# Patient Record
Sex: FEMALE | Race: ASIAN | URBAN - METROPOLITAN AREA
[De-identification: names, ages, dates, MRNs, and addresses within clinical notes are randomized per-mention and may not be internally consistent; named-entity substitution may affect disease eponyms.]

---

## 2017-08-10 ENCOUNTER — OFFICE VISIT (OUTPATIENT)
Dept: FAMILY MEDICINE | Facility: CLINIC | Age: 27
End: 2017-08-10

## 2017-08-10 VITALS
DIASTOLIC BLOOD PRESSURE: 72 MMHG | TEMPERATURE: 97.9 F | BODY MASS INDEX: 16.05 KG/M2 | HEART RATE: 83 BPM | SYSTOLIC BLOOD PRESSURE: 109 MMHG | WEIGHT: 90.6 LBS | RESPIRATION RATE: 16 BRPM | HEIGHT: 63 IN | OXYGEN SATURATION: 98 %

## 2017-08-10 DIAGNOSIS — Z00.00 ROUTINE GENERAL MEDICAL EXAMINATION AT A HEALTH CARE FACILITY: ICD-10-CM

## 2017-08-10 DIAGNOSIS — Z23 IMMUNIZATION DUE: ICD-10-CM

## 2017-08-10 DIAGNOSIS — Z12.4 SCREENING FOR CERVICAL CANCER: ICD-10-CM

## 2017-08-10 NOTE — PROGRESS NOTES
Preceptor Attestation:  Patient's case reviewed and discussed with Dianna Duncan MD resident and I evaluated the patient. I agree with written assessment and plan of care.  Supervising Physician:Nacho Brooke MD    [facility]

## 2017-08-10 NOTE — PATIENT INSTRUCTIONS
Preventive Health Recommendations  Female Ages 26 - 39  Yearly exam:   See your health care provider every year in order to    Review health changes.     Discuss preventive care.      Review your medicines if you your doctor has prescribed any.    Until age 30: Get a Pap test every three years (more often if you have had an abnormal result).    After age 30: Talk to your doctor about whether you should have a Pap test every 3 years or have a Pap test with HPV screening every 5 years.   You do not need a Pap test if your uterus was removed (hysterectomy) and you have not had cancer.  You should be tested each year for STDs (sexually transmitted diseases), if you're at risk.   Talk to your provider about how often to have your cholesterol checked.  If you are at risk for diabetes, you should have a diabetes test (fasting glucose).  Shots: Get a flu shot each year. Get a tetanus shot every 10 years.   Nutrition:     Eat at least 5 servings of fruits and vegetables each day.    Eat whole-grain bread, whole-wheat pasta and brown rice instead of white grains and rice.    Talk to your provider about Calcium and Vitamin D.     Lifestyle    Exercise at least 150 minutes a week (30 minutes a day, 5 days of the week). This will help you control your weight and prevent disease.    Limit alcohol to one drink per day.    No smoking.     Wear sunscreen to prevent skin cancer.    See your dentist every six months for an exam and cleaning.    Why Have a Pap Test?  Early on, cervical changes don't cause symptoms. Often, the only way to know you have cervical changes is to do a Pap test. A Pap test can find these problems early, when they are easier to treat. Pap tests can also detect some infections of the cervix and vagina.  What is a Pap test?  A Pap test is a procedure that helps find changes in the cervix that may lead to cancer. The cervix is the part of the uterus that opens into the vagina. For this test, a small sample of  cells is taken from the cervix. This is done in your health care provider s office. The cells are then analyzed in a lab. A Pap test is a safe procedure. It takes just a few minutes and causes little or no discomfort.  The HPV connection  Human papillomavirus or HPV is a family of viruses that spread through skin contact. Certain types are almost always spread through sexual contact. Some HPV types cause genital warts (condyloma). But not all types of HPV cause visible symptoms. Certain types cause cell changes (dysplasia) in the cervix that can lead to cancer. In fact, HPV infection is the most important risk factor for cervical cancer. Doctors can now test for HPV. Testing for HPV is often done with the Pap test. That s why it s important to have Pap tests as recommended by your health care provider. This helps ensure that any abnormal cells will be found and treated before they become cancerous.  Who should have a Pap test and HPV test?  Ask your health care provider when to start having Pap tests, whether you should have an HPV test done at the same time, and how often to have them. Follow these guidelines from the American Cancer Society for cervical cancer screening:    A first Pap test at age 21. And then every 3 years until age 29, HPV testing is not recommended during this time, though it may be done to follow-up on an abnormal Pap test.    Starting at age 30, the preferred testing is a Pap test done with an HPV test every 5 years. This should be done until age 65. Another option for women in this 30-65 age group is to have just the Pap test done every 3 years.    You may need a different screening schedule if you are at high risk for cervical cancer. Risk factors include having HIV, a weak immune system, or exposure to the medication MEMO while your mother was pregnant with you. Talk with your doctor about the best schedule for you.    If you re over 65 and have had regular screenings for the last 10 years  with no abnormal results in the last 20 years, you may stop cervical cancer screening.    If you had a hysterectomy that included removing your cervix, you can stop screening unless the hysterectomy was done to treat cervical cancer or pre-cancer. If you still have your cervix after the hysterectomy, you should continue screening according to the above guidelines.    No woman of any age needs to be tested each year.    Women who have been vaccinated for HPV should still follow these guidelines.    If you have had cervical cancer, talk to your doctor about the screening plan that's best for you.  Date Last Reviewed: 5/18/2015 2000-2017 [employer] [address] [place] [address], [place] [address]. All rights reserved. This information is not intended as a substitute for professional medical care. Always follow your healthcare professional's instructions.

## 2017-08-10 NOTE — MR AVS SNAPSHOT
After Visit Summary   8/10/2017    Sophy Hernandez    MRN: [id number]           Patient Information     Date Of Birth          1990        Visit Information        Provider Department      8/10/2017 10:00 AM Dianna Duncan MD [facility]        Today's Diagnoses     Immunization due        Routine general medical examination at a health care facility        Screening for cervical cancer          Care Instructions      Preventive Health Recommendations  Female Ages 26 - 39  Yearly exam:   See your health care provider every year in order to    Review health changes.     Discuss preventive care.      Review your medicines if you your doctor has prescribed any.    Until age 30: Get a Pap test every three years (more often if you have had an abnormal result).    After age 30: Talk to your doctor about whether you should have a Pap test every 3 years or have a Pap test with HPV screening every 5 years.   You do not need a Pap test if your uterus was removed (hysterectomy) and you have not had cancer.  You should be tested each year for STDs (sexually transmitted diseases), if you're at risk.   Talk to your provider about how often to have your cholesterol checked.  If you are at risk for diabetes, you should have a diabetes test (fasting glucose).  Shots: Get a flu shot each year. Get a tetanus shot every 10 years.   Nutrition:     Eat at least 5 servings of fruits and vegetables each day.    Eat whole-grain bread, whole-wheat pasta and brown rice instead of white grains and rice.    Talk to your provider about Calcium and Vitamin D.     Lifestyle    Exercise at least 150 minutes a week (30 minutes a day, 5 days of the week). This will help you control your weight and prevent disease.    Limit alcohol to one drink per day.    No smoking.     Wear sunscreen to prevent skin cancer.    See your dentist every six months for an exam and cleaning.    Why Have a Pap Test?  Early on, cervical  changes don't cause symptoms. Often, the only way to know you have cervical changes is to do a Pap test. A Pap test can find these problems early, when they are easier to treat. Pap tests can also detect some infections of the cervix and vagina.  What is a Pap test?  A Pap test is a procedure that helps find changes in the cervix that may lead to cancer. The cervix is the part of the uterus that opens into the vagina. For this test, a small sample of cells is taken from the cervix. This is done in your health care provider s office. The cells are then analyzed in a lab. A Pap test is a safe procedure. It takes just a few minutes and causes little or no discomfort.  The HPV connection  Human papillomavirus or HPV is a family of viruses that spread through skin contact. Certain types are almost always spread through sexual contact. Some HPV types cause genital warts (condyloma). But not all types of HPV cause visible symptoms. Certain types cause cell changes (dysplasia) in the cervix that can lead to cancer. In fact, HPV infection is the most important risk factor for cervical cancer. Doctors can now test for HPV. Testing for HPV is often done with the Pap test. That s why it s important to have Pap tests as recommended by your health care provider. This helps ensure that any abnormal cells will be found and treated before they become cancerous.  Who should have a Pap test and HPV test?  Ask your health care provider when to start having Pap tests, whether you should have an HPV test done at the same time, and how often to have them. Follow these guidelines from the American Cancer Society for cervical cancer screening:    A first Pap test at age 21. And then every 3 years until age 29, HPV testing is not recommended during this time, though it may be done to follow-up on an abnormal Pap test.    Starting at age 30, the preferred testing is a Pap test done with an HPV test every 5 years. This should be done until age  65. Another option for women in this 30-65 age group is to have just the Pap test done every 3 years.    You may need a different screening schedule if you are at high risk for cervical cancer. Risk factors include having HIV, a weak immune system, or exposure to the medication MEMO while your mother was pregnant with you. Talk with your doctor about the best schedule for you.    If you re over 65 and have had regular screenings for the last 10 years with no abnormal results in the last 20 years, you may stop cervical cancer screening.    If you had a hysterectomy that included removing your cervix, you can stop screening unless the hysterectomy was done to treat cervical cancer or pre-cancer. If you still have your cervix after the hysterectomy, you should continue screening according to the above guidelines.    No woman of any age needs to be tested each year.    Women who have been vaccinated for HPV should still follow these guidelines.    If you have had cervical cancer, talk to your doctor about the screening plan that's best for you.  Date Last Reviewed: 5/18/2015 2000-2017 [employer]. [address] [place] [address]. All rights reserved. This information is not intended as a substitute for professional medical care. Always follow your healthcare professional's instructions.                Follow-ups after your visit        Who to contact     Please call your clinic at [phone] to:    Ask questions about your health    Make or cancel appointments    Discuss your medicines    Learn about your test results    Speak to your doctor   If you have compliments or concerns about an experience at your clinic, or if you wish to file a complaint, please contact [facility] Patient Relations at [phone] or email us at [email or web address]         Additional Information About Your Visit        [employer] Information     [employer] is an electronic gateway  "that provides easy, online access to your medical records. With [employer], you can request a clinic appointment, read your test results, renew a prescription or communicate with your care team.     To sign up for [employer] visit the website at [email or web address]   You will be asked to enter the access code listed below, as well as some personal information. Please follow the directions to create your username and password.     Your access code is: [email or web address]  Expires: 2017 10:20 AM     Your access code will  in 90 days. If you need help or a new code, please contact your [facility] or call [phone] for assistance.        Care EveryWhere ID     This is your Care EveryWhere ID. This could be used by other organizations to access your [facility] medical records  [id number]        Your Vitals Were     Pulse Temperature Respirations Height Last Period Pulse Oximetry    83 97.9  F (36.6  C) (Oral) 16 5' 2.75\" (159.4 cm) 2017 (Approximate) 98%    BMI (Body Mass Index)                   16.18 kg/m2            Blood Pressure from Last 3 Encounters:   08/10/17 109/72    Weight from Last 3 Encounters:   08/10/17 90 lb 9.6 oz (41.1 kg)              We Performed the Following     ADMIN VACCINE, INITIAL     TDAP VACCINE (BOOSTRIX)        Primary Care Provider Office Phone # Fax #    Dianna Duncan [phone]       [facility] [address] [place] [address]        Equal Access to Services     FELISA BENAVIDES AH: Hadii aad ku hadasho Soomaali, waaxda luqadaha, qaybta kaalmada adeegyada, izzy yadav. So [facility] [phone].    ATENCIÓN: Si habla español, tiene a martin disposición servicios gratuitos de asistencia lingüística. Llame al [phone].    We comply with applicable federal civil rights laws and [place] laws. We do not discriminate on the basis of race, color, national origin, age, disability sex, " sexual orientation or gender identity.            Thank you!     Thank you for choosing [facility]  for your care. Our goal is always to provide you with excellent care. Hearing back from our patients is one way we can continue to improve our services. Please take a few minutes to complete the written survey that you may receive in the mail after your visit with us. Thank you!             Your Updated Medication List - Protect others around you: Learn how to safely use, store and throw away your medicines at www.disposemymeds.org.      Notice  As of 8/10/2017 11:16 AM    You have not been prescribed any medications.

## 2017-08-10 NOTE — PROGRESS NOTES
Female Physical Note  Concerns today: No special concerns today.    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     Traveling to [facility] to visit a friend in April. Wondering how to get a travel appt.     ROS:  CONSTITUTIONAL: no fatigue, no unexpected change in weight  SKIN: no worrisome rashes, no worrisome moles, no worrisome lesions  EYES: no acute vision problems or changes  ENT: no ear problems, no mouth problems, no throat problems  RESP: no significant cough, no shortness of breath  CV: no chest pain, no palpitations, no new or worsening peripheral edema  GI: no nausea, no vomiting, no constipation, no diarrhea  : regular menses. No dysuria.     Sexually Active: Yes  Sexual concerns: No   Contraception:Details: condoms.      P: 0  Menarche: 12 Patient's last menstrual period was 2017 (approximate). Menses: q 28 days  STD History: Neg  Last Pap Smear Date: None.   Abnormal Pap History: None    There is no problem list on file for this patient.      No current outpatient prescriptions on file.       History reviewed. No pertinent past medical history.     Family History     Problem (# of Occurrences) Relation (Name,Age of Onset)    Anxiety Disorder (1) Other    Asthma (1) Other    Breast Cancer (1) Other    Colon Cancer (1) Other    Coronary Artery Disease (1) Other    DIABETES (1) Other    Depression (1) Other    Hypertension (1) Other    Other Cancer (1) Other    Prostate Cancer (1) Other    Thyroid Disease (1) Other          Problem List Medication List and Allergy List were reviewed.    Patient is a new patient to this clinic and so  I reviewed/updated the Past Medical History, the Family History and the Social History. .    Social History   Substance Use Topics     Smoking status: Never Smoker     Smokeless tobacco: Never Used     Alcohol use Yes      Comment: Occasionally     Single  Children ? no    Has anyone hurt you physically, for example by pushing, hitting, slapping or kicking you or  "forcing you to have sex? Denies  Do you feel threatened or controlled by a partner, ex-partner or anyone in your life? Denies    RISK BEHAVIORS AND HEALTHY HABITS:  Tobacco Use/Smoking: None  Illicit Drug Use: None  Do you use alcohol? Yes, special occasions.   Diet (5-7 servings of fruits/veg daily): No, busy, forgets about it. Eats vegetables.  Exercise (30 min accumulated most days):Yes, running and yoga.   Dental Care: Yes   Calcium 1500 mg/d:  No  Seat Belt Use: Yes     Pap every 3 years for women 21-29. Recommended and patient declined testing. Would like to schedule in future, nervous.    Immunization History   Administered Date(s) Administered     DTAP (<7y) 1990, [birth year], 12/18/1991, 11/16/1994     HIB 03/18/1992     HepB-Peds 1990, [birth year]     Influenza Intranasal Vaccine 4 valent 12/05/2003, 10/13/2009     MMR 1990, 01/10/1991, 06/10/1991, 11/16/1994     Poliovirus, inactivated (IPV) 1990, 1990, 12/18/1991, 11/16/1994     TDAP Vaccine (Boostrix) 08/29/2002    Reviewed Immunization Record Today    EXAMINATION:   /72 (BP Location: Right arm, Patient Position: Sitting, Cuff Size: Child)  Pulse 83  Temp 97.9  F (36.6  C) (Oral)  Resp 16  Ht 5' 2.75\" (159.4 cm)  Wt 90 lb 9.6 oz (41.1 kg)  LMP 07/03/2017 (Approximate)  SpO2 (!) 83%  BMI 16.18 kg/m2  GENERAL: healthy, alert and no distress  EYES: Eyes grossly normal to inspection, extraocular movements - intact, and PERRL  HENT: ear canals- normal; TMs- normal; Nose- normal; Mouth- no ulcers, no lesions  NECK: no tenderness, no adenopathy, no asymmetry, no masses, no stiffness; thyroid- normal to palpation  RESP: lungs clear to auscultation - no rales, no rhonchi, no wheezes  CV: regular rates and rhythm, normal S1 S2, no S3 or S4 and no murmur, no click or rub -  ABDOMEN: soft, no tenderness, no  hepatosplenomegaly, no masses, normal bowel sounds  MS: extremities- no gross deformities " noted, no edema  SKIN: no suspicious lesions, no rashes  NEURO: strength and tone- normal, sensory exam- grossly normal, mentation- intact, speech- normal, reflexes- symmetric  BACK: no CVA tenderness, no paralumbar tenderness  - declined exam.   PSYCH: Alert and oriented times 3; speech- coherent , normal rate and volume; able to articulate logical thoughts, able to abstract reason, no tangential thoughts, no hallucinations or delusions, affect- normal  LYMPHATICS: ant. cervical- normal, post. cervical- normal, axillary- normal, supraclavicular- normal, inguinal- normal    ASSESSMENT:  1. Health Care Maintenance: Normal Physical Exam  2. Due for pap- declines today because she is nervous. Explained the pap test and procedure to her.   3. Travel recs.     PLAN:  1. Routine follow up in one year. Due for Tdap today which she accepts.  2. She will schedule again when she can prepare.   3. Reviewed with her the [facility] travel website, she is familiar She will schedule a visit about two months prior to travel. Appears she may need Japanese encephalitis, malaria, typhoid ppx.       Dianna Duncan MD    Precepted today with: Nacho Brooke MD

## 2017-08-22 ENCOUNTER — OFFICE VISIT (OUTPATIENT)
Dept: FAMILY MEDICINE | Facility: CLINIC | Age: 27
End: 2017-08-22

## 2017-08-22 VITALS
DIASTOLIC BLOOD PRESSURE: 72 MMHG | TEMPERATURE: 98.1 F | RESPIRATION RATE: 20 BRPM | WEIGHT: 93 LBS | SYSTOLIC BLOOD PRESSURE: 110 MMHG | HEART RATE: 61 BPM | OXYGEN SATURATION: 100 % | HEIGHT: 62 IN | BODY MASS INDEX: 17.11 KG/M2

## 2017-08-22 DIAGNOSIS — Z12.4 ENCOUNTER FOR SCREENING FOR CERVICAL CANCER: Primary | ICD-10-CM

## 2017-08-22 NOTE — MR AVS SNAPSHOT
"              After Visit Summary   2017    Sophy Hernandez    MRN: [id number]           Patient Information     Date Of Birth          1990        Visit Information        Provider Department      2017 9:00 AM Dianna Duncan MD [facility]        Today's Diagnoses     Encounter for screening for cervical cancer     -  1       Follow-ups after your visit        Who to contact     Please call your clinic at [phone] to:    Ask questions about your health    Make or cancel appointments    Discuss your medicines    Learn about your test results    Speak to your doctor   If you have compliments or concerns about an experience at your clinic, or if you wish to file a complaint, please contact [facility] Patient Relations at [phone] or email us at [email or web address]         Additional Information About Your Visit        MyChart Information     [employer] is an electronic gateway that provides easy, online access to your medical records. With [employer], you can request a clinic appointment, read your test results, renew a prescription or communicate with your care team.     To sign up for [employer] visit the website at [email or web address]   You will be asked to enter the access code listed below, as well as some personal information. Please follow the directions to create your username and password.     Your access code is: [email or web address]  Expires: 2017 10:20 AM     Your access code will  in 90 days. If you need help or a new code, please contact your [facility] or call [phone] for assistance.        Care EveryWhere ID     This is your Care EveryWhere ID. This could be used by other organizations to access your [facility] medical records  [id number]        Your Vitals Were     Pulse Temperature Respirations Height Last Period Pulse Oximetry    61 98.1  F (36.7  C) (Oral) 20 5' 1.5\" (156.2 cm) 2017 " (Approximate) 100%    BMI (Body Mass Index)                   17.29 kg/m2            Blood Pressure from Last 3 Encounters:   08/22/17 110/72   08/10/17 109/72    Weight from Last 3 Encounters:   08/22/17 93 lb (42.2 kg)   08/10/17 90 lb 9.6 oz (41.1 kg)              We Performed the Following     GYN Cytology ([facility])        Primary Care Provider Office Phone # Fax #    Dianna Duncan [phone]       [facility] [address] [place] [address]        Equal Access to Services     [facility] Hospital[facility]ABIHA : Hadii aad ku hadasho Soomaali, waaxda luqadaha, qaybta kaalmada adeegyada, izzy roche . So [facility] [phone].    ATENCIÓN: Si habla español, tiene a martin disposición servicios gratuitos de asistencia lingüística. Llame al [phone].    We comply with applicable federal civil rights laws and [place] laws. We do not discriminate on the basis of race, color, national origin, age, disability sex, sexual orientation or gender identity.            Thank you!     Thank you for choosing [facility]  for your care. Our goal is always to provide you with excellent care. Hearing back from our patients is one way we can continue to improve our services. Please take a few minutes to complete the written survey that you may receive in the mail after your visit with us. Thank you!             Your Updated Medication List - Protect others around you: Learn how to safely use, store and throw away your medicines at www.disposemymeds.org.      Notice  As of 8/22/2017 12:14 PM    You have not been prescribed any medications.

## 2017-08-22 NOTE — PROGRESS NOTES
"       HPI:       Sophy Hernandez is a 27 year old  female without a significant past medical history who presents for follow up concern of pap smear.   She was too nervous to get it done during CPE last week.   She has been worried about this as she has never had pap smear before.   We again talked through the procedure and also why its important to get it done.   No other concerns.          PMHX:     Patient Active Problem List   Diagnosis   (none) - all problems resolved or deleted       No current outpatient prescriptions on file.        No Known Allergies           Review of Systems:   As above          Physical Exam:     Vitals:    08/22/17 0908   BP: 110/72   BP Location: Left arm   Patient Position: Chair   Cuff Size: Adult Regular   Pulse: 61   Resp: 20   Temp: 98.1  F (36.7  C)   TempSrc: Oral   SpO2: 100%   Weight: 93 lb (42.2 kg)   Height: 5' 1.5\" (156.2 cm)     Body mass index is 17.29 kg/(m^2).    Vitals reviewed and normal.  GENERAL APPEARANCE: healthy, alert and no distress  EYES: Eyes grossly normal to inspection, conjunctivae and sclerae normal  HENT:  oropharynx clear and oral mucous membranes moist  RESP: breathing comfortably in room air.   CV: no peripheral edema, peripheral pulses 2+   (female): normal female external genitalia, vaginal mucosa pink, moist, well rugated and normal cervix, adnexae, and uterus without masses. Normal vaginal discharge  SKIN: no suspicious lesions or rashes  PSYCH: mentation appears normal and affect normal    Assessment and Plan     (Z12.4) Encounter for screening for cervical cancer   (primary encounter diagnosis)- normal appearing cervix. Reviewed recs for screening.   Plan: GYN cytology sent.   Will send letter if normal, Will call if abnormal.   Discussed results take about one week.     Options for treatment and follow-up care were reviewed with the patient and/or guardian. Sophy Her and/or guardian engaged in the decision making process and verbalized " understanding of the options discussed and agreed with the final plan.    Dianna Duncan MD    Precepted today with: Nacho Brooke MD

## 2017-08-30 ENCOUNTER — RECORDS - HEALTHEAST (OUTPATIENT)
Dept: ADMINISTRATIVE | Facility: OTHER | Age: 27
End: 2017-08-30

## 2017-08-30 LAB
CYTOLOGY CVX/VAG DOC THIN PREP: ABNORMAL
HIGH RISK?: NO
HPV REFLEX?: ABNORMAL
LAB AP ABNORMAL BLEEDING: NO
LAB AP BIRTH CONTROL/HORMONES: ABNORMAL
LAB AP CASE REPORT: ABNORMAL
LAB AP CERVICAL APPEARANCE: NORMAL
LAB AP MALIGNANT?: ABNORMAL
LAB AP PATIENT STATUS: ABNORMAL
LAB AP PREVIOUS ABNL: ABNORMAL
LAB AP PREVIOUS NORMAL: ABNORMAL
LAST MENS PERIOD: ABNORMAL
SPECIMEN ADEQUACY:: ABNORMAL

## 2017-09-01 ENCOUNTER — TELEPHONE (OUTPATIENT)
Dept: FAMILY MEDICINE | Facility: CLINIC | Age: 27
End: 2017-09-01

## 2017-09-01 PROBLEM — R87.610 PAPANICOLAOU SMEAR OF CERVIX WITH ATYPICAL SQUAMOUS CELLS OF UNDETERMINED SIGNIFICANCE (ASC-US): Status: ACTIVE | Noted: 2017-08-28

## 2017-09-01 LAB
INTERPRETATION: ABNORMAL
INTERPRETER: ABNORMAL

## 2017-09-01 NOTE — TELEPHONE ENCOUNTER
Reviewed. Action completed:  Notes Recorded by Karlee Kunz MA on 9/1/2017 at 1:43 PM  Called patient and informed results and recommendations. Explained procedure to patient as she seems worried and concerned. Patient reports being on her menstrual cycle so informed to try and make appointment when it has completed. Transferred call to  for her to make with Dr Luo.  ------    Notes Recorded by Dianna Duncan MD on 9/1/2017 at 12:11 PM  Patient was called, no answer, no descript VM so LVM to call clinic back.   Sophy will be very nervous.  She needs colposcopy- pap smear showed possibly abnormal cells and her test for HPV (human papilloma virus) was positive.   Please have her schedule her earliest convenience with Dr. Luo as soon as she can.   Thank you.   Dianna

## 2017-09-01 NOTE — TELEPHONE ENCOUNTER
[facility] phone call message- patient requesting results:    Test: Lab    Date of test:     Additional Comments: Patient is returning Dr. Duncan's call, in the message left for her Dr. Duncan states to call back and talk to Dr. Luo. Told her that she is not in clinic today but will send a message. Please call and advise.     OK to leave a message on voice mail? Yes    Primary language: English      needed? No    Call taken on September 1, 2017 at 12:54 PM by Chepe Justice

## 2017-09-26 ENCOUNTER — TELEPHONE (OUTPATIENT)
Dept: FAMILY MEDICINE | Facility: CLINIC | Age: 27
End: 2017-09-26

## 2017-09-26 NOTE — TELEPHONE ENCOUNTER
I called patient to follow up on abnormal pap with HPV as she has not yet been scheduled for colposcopy. She states that she had tried calling the clinic earlier in the month but wasn't able to schedule it yet. I answered questions about pap tests, HPV, and colposcopy. She was very nervous about having a pap test and has had a lot of anxiety about the colposcopy as well. She should be scheduled for a colposcopy with me if possible to be done within the next month. I called , who will call patient this morning to schedule.

## 2017-10-03 ENCOUNTER — OFFICE VISIT (OUTPATIENT)
Dept: FAMILY MEDICINE | Facility: CLINIC | Age: 27
End: 2017-10-03

## 2017-10-03 VITALS
OXYGEN SATURATION: 97 % | DIASTOLIC BLOOD PRESSURE: 65 MMHG | HEART RATE: 61 BPM | TEMPERATURE: 97.7 F | SYSTOLIC BLOOD PRESSURE: 102 MMHG | BODY MASS INDEX: 17.48 KG/M2 | HEIGHT: 62 IN | WEIGHT: 95 LBS

## 2017-10-03 DIAGNOSIS — Z23 NEED FOR PROPHYLACTIC VACCINATION AND INOCULATION AGAINST INFLUENZA: ICD-10-CM

## 2017-10-03 DIAGNOSIS — R87.610 PAPANICOLAOU SMEAR OF CERVIX WITH ATYPICAL SQUAMOUS CELLS OF UNDETERMINED SIGNIFICANCE (ASC-US): Primary | ICD-10-CM

## 2017-10-03 LAB — HCG UR QL: NEGATIVE

## 2017-10-03 NOTE — MR AVS SNAPSHOT
After Visit Summary   10/3/2017    Sophy Hernandez    MRN: [id number]           Patient Information     Date Of Birth          1990        Visit Information        Provider Department      10/3/2017 10:00 AM Gricel Luo MD; [facility] PROCEDURE ROOM [facility]        Today's Diagnoses     Papanicolaou smear of cervix with atypical squamous cells of undetermined significance (ASC-US)    -  1      Care Instructions    - we will let you know the results of your colposcopy by early next week or sooner if we have them earlier  - you can take tylenol or aleve or ibuprofen if needed for cramping  - brown discharge and some bleeding or cramping is normal. Call the clinic if it is a lot heavier than a period  - we will plan to repeat a pap test in 1 years          Follow-ups after your visit        Follow-up notes from your care team     Return if symptoms worsen or fail to improve.      Who to contact     Please call your clinic at [phone] to:    Ask questions about your health    Make or cancel appointments    Discuss your medicines    Learn about your test results    Speak to your doctor   If you have compliments or concerns about an experience at your clinic, or if you wish to file a complaint, please contact [facility] Patient Relations at [phone] or email us at [email or web address]         Additional Information About Your Visit        MyChart Information     [employer] is an electronic gateway that provides easy, online access to your medical records. With Story of My Life, you can request a clinic appointment, read your test results, renew a prescription or communicate with your care team.     To sign up for [employer] visit the website at [email or web address]   You will be asked to enter the access code listed below, as well as some personal information. Please follow the directions to create your username and password.     Your access code is:  [email or web address]  Expires: 2017 10:20 AM     Your access code will  in 90 days. If you need help or a new code, please contact your [facility] or call [phone] for assistance.        Care EveryWhere ID     This is your Care EveryWhere ID. This could be used by other organizations to access your [facility] medical records  [id number]        Your Vitals Were     Pulse Temperature Height Pulse Oximetry BMI (Body Mass Index)       61 97.7  F (36.5  C) (Oral) 5' 1.75\" (156.8 cm) 97% 17.52 kg/m2        Blood Pressure from Last 3 Encounters:   10/03/17 102/65   17 110/72   08/10/17 109/72    Weight from Last 3 Encounters:   10/03/17 95 lb (43.1 kg)   17 93 lb (42.2 kg)   08/10/17 90 lb 9.6 oz (41.1 kg)              We Performed the Following     Chlamydia/Gono Amplified ([facility])     HCG Qualitative Urine (UPT)  ([facility])        Primary Care Provider Office Phone # Fax #    Gricel Ashley Luo [phone]       [facility] [address] [place] [address]        Equal Access to Services     FELISA BENAVIDES AH: Hadii david ku hadasho Soomaali, waaxda luqadaha, qaybta kaalmada adeegyada, waxay idiin hayclementinan claudette roche . So [facility] [phone].    ATENCIÓN: Si habla español, tiene a martin disposición servicios gratuitos de asistencia lingüística. ame al [phone].    We comply with applicable federal civil rights laws and [place] laws. We do not discriminate on the basis of race, color, national origin, age, disability, sex, sexual orientation, or gender identity.            Thank you!     Thank you for choosing [facility]  for your care. Our goal is always to provide you with excellent care. Hearing back from our patients is one way we can continue to improve our services. Please take a few minutes to complete the written survey that you may receive in the mail after your visit with us. Thank you!             Your Updated " Medication List - Protect others around you: Learn how to safely use, store and throw away your medicines at www.disposemymeds.org.      Notice  As of 10/3/2017 11:49 AM    You have not been prescribed any medications.

## 2017-10-03 NOTE — NURSING NOTE
Sophy Hernandez      1.  Has the patient received the information for the influenza vaccine? YES    2.  Does the patient have any of the following contraindications?     Allergy to eggs? No     Allergic reaction to previous influenza vaccines? No     Any other problems to previous influenza vaccines? No     Paralyzed by Guillain-[place] syndrome? No     Currently pregnant? NO     Current moderate or severe illness? No    Vaccination given by ea Her, CMA.  Recorded by jerrod Her

## 2017-10-03 NOTE — LETTER
October 6, 2017      Sophy Her  [address] [place] [address]        Dear Sophy,    Some of the test results from your last visit are back. You do not have gonorrhea or chlamydia. Your pathology results from the colposcopy are not back yet.     Please see below for your test results.    Resulted Orders   HCG Qualitative Urine (UPT)  ([facility])   Result Value Ref Range    HCG Qual Urine NEGATIVE Negative   Chlamydia/Gono Amplified ([facility])   Result Value Ref Range    Chlamydia trac,Amplified Prb Negative Negative    N gonorrhoeae,Amplified Prb Negative Negative    Narrative    Test performed by:  [facility]  [address] [place] [address]       If you have any questions, please call the clinic to make an appointment.    Sincerely,    Gricel Luo MD

## 2017-10-03 NOTE — PROGRESS NOTES
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  Gen: alert, NAD  HENT: oral mucosa moist  Abd: soft, nontender  MS: extremities grossly normal  Skin: no rashes on exposed skin  Neuro: mentation intact, speech normal  Psych: mood normal, affect normal    Colposcopy Procedure Note    History:  Sophy Hernandez is a patient of Gricel Moffett that  presents for colposcopy for ASCUS HPV high risk positive.  STI history: negative  Contraception  condoms  Smoking?  No  Taking folate?   No  ASA in last week? No  NSAID taken today? Yes    Consent: Affirmation of informed consent signed and scanned into medical record. Risks, benefits and alternatives discussed. Patient's questions were elicited and answered.  Procedure safety checklist was completed:  Yes  Time Out (Pause for the Cause) completed: Yes    Labs:   UPT:  Negative    Procedure:  Patient positioned for colposcopy. Acetic acid applied. Lugol's applied.   SCJ seen entirely? Yes  [place] was satisfactory with ECC   cervix swabbed with Lugol's solution, SCJ visualized 360 degrees without lesions, no biopsies taken and specimen labelled and sent to pathology  Bleeding controlled with: with simple pressure  EBL: 0    Findings:   Vagina   normal " without visible lesions    Vulva  normal mucosa without lesions    Cervix  Color:  Zero (0) Points - Shiny, snow white.  Transient, indistinct acetowhite, semitransparent.  Margin: Zero (0) Points - Condylomatous or micropapillary contour.  Indistinct borders.   Vessels: Zero (0) Points - Fine, punctuation or fine mosaic.  Uniform, fine caliber, nondilated capillary loops.  Narrow intercapillary distance.  Lugol's: Zero (0) Points - Positive iodine uptake, producing a zully brown color OR Negative iodine uptake (mustard yellow) of a zully brown color lesion recognized as low grade by above criteria.  Total Pts: 0      (0-2 mild, 3-5 mod, 6-8 severe)     Colposcopic Impression: Dysplasia None    Tolerance:   Patient tolerated procedure well    Plan:  Follow up in 2 weeks for biopsy results.  Discouraged smoking - Quit Plan Referral No  Gardasil vaccine administered - follow up in 2 months for repeat dose.   Folic acid 5mg po daily  Discharge instructions discussed including RTC or call if bleeding >1pph, fever, abdominal pain or foul smelling discharge.     Resident: Gricel Luo  Faculty: Dr. Hanna present for and supervised this entire procedure    Assessment and Plan     1. Papanicolaou smear of cervix with atypical squamous cells of undetermined significance (ASC-US)  Colposcopy today without abnormal appearance. ECC pathology sent. Has not had screening for GC/chlam ever.  - HCG Qualitative Urine (UPT)  ([facility])  - Chlamydia/Gono Amplified ([facility])  - Pathology  ([facility])    2. Need for prophylactic vaccination and inoculation against influenza  - ADMIN VACCINE, INITIAL  - Flu vaccine, quad, preserve-free, 0.5 ml    Follow up: 1 yr for repeat pap, cpe    Gricel Luo MD, MPH  [facility]    Precepted with: Alysha Hanna MD    Options for treatment and follow-up care were reviewed with the patient and/or guardian. Thayeng Her and/or guardian engaged in the decision making  process and verbalized understanding of the options discussed and agreed with the final plan.  PMHX:     Patient Active Problem List   Diagnosis     Papanicolaou smear of cervix with atypical squamous cells of undetermined significance (ASC-US)     No current outpatient prescriptions on file.     Family History   Problem Relation Age of Onset     DIABETES Other      Coronary Artery Disease Other      Hypertension Other      Breast Cancer Other      Social History     Social History Narrative    Education: Undergrad         No Known Allergies  Results for orders placed or performed in visit on 10/03/17 (from the past 24 hour(s))   HCG Qualitative Urine (UPT)  ([facility])   Result Value Ref Range    HCG Qual Urine NEGATIVE Negative

## 2017-10-03 NOTE — PROGRESS NOTES
Preceptor Attestation:  Patient's case reviewed and discussed with  Patient seen and discussed with the resident..  I agree with written assessment and plan of care.    I was present for entirety of colposcopy performed by Dr Luo.  Healthy appearing cervix with no focal abnormalities.  ECC collected but not other bx required today.  GC collected today.       Supervising Physician:  Dagmar Hanna MD  [facility]

## 2017-10-03 NOTE — PATIENT INSTRUCTIONS
- we will let you know the results of your colposcopy by early next week or sooner if we have them earlier  - you can take tylenol or aleve or ibuprofen if needed for cramping  - brown discharge and some bleeding or cramping is normal. Call the clinic if it is a lot heavier than a period  - we will plan to repeat a pap test in 1 years

## 2017-10-04 LAB
C TRACH RRNA CVX QL NAA+PROBE: NEGATIVE
N GONORRHOEA RRNA SPEC QL NAA+PROBE: NEGATIVE

## 2017-10-10 ENCOUNTER — RECORDS - HEALTHEAST (OUTPATIENT)
Dept: ADMINISTRATIVE | Facility: OTHER | Age: 27
End: 2017-10-10

## 2017-10-10 LAB
LAB AP CASE REPORT: NORMAL
LAB AP CHARGES: NORMAL
LAB AP CLINICAL INFORMATION: NORMAL
LAB AP DIAGNOSIS COMMENT: NORMAL
LAB AP FINAL DIAGNOSIS: NORMAL
LAB AP GROSS DESCRIPTION: NORMAL
LAB AP MALIGNANT?: NORMAL
LAB AP MICROSCOPIC DESCRIPTION: NORMAL
LAB AP SPECIAL STAINS: NORMAL

## 2019-11-08 ENCOUNTER — HEALTH MAINTENANCE LETTER (OUTPATIENT)
Age: 29
End: 2019-11-08

## 2020-02-26 ENCOUNTER — TELEPHONE (OUTPATIENT)
Dept: FAMILY MEDICINE | Facility: CLINIC | Age: 30
End: 2020-02-26

## 2020-12-06 ENCOUNTER — HEALTH MAINTENANCE LETTER (OUTPATIENT)
Age: 30
End: 2020-12-06

## 2021-01-15 ENCOUNTER — HEALTH MAINTENANCE LETTER (OUTPATIENT)
Age: 31
End: 2021-01-15

## 2021-09-25 ENCOUNTER — HEALTH MAINTENANCE LETTER (OUTPATIENT)
Age: 31
End: 2021-09-25

## 2022-01-15 ENCOUNTER — HEALTH MAINTENANCE LETTER (OUTPATIENT)
Age: 32
End: 2022-01-15

## 2023-01-07 ENCOUNTER — HEALTH MAINTENANCE LETTER (OUTPATIENT)
Age: 33
End: 2023-01-07

## 2023-04-22 ENCOUNTER — HEALTH MAINTENANCE LETTER (OUTPATIENT)
Age: 33
End: 2023-04-22